# Patient Record
Sex: FEMALE | Race: BLACK OR AFRICAN AMERICAN | NOT HISPANIC OR LATINO | Employment: UNEMPLOYED | URBAN - METROPOLITAN AREA
[De-identification: names, ages, dates, MRNs, and addresses within clinical notes are randomized per-mention and may not be internally consistent; named-entity substitution may affect disease eponyms.]

---

## 2022-03-18 DIAGNOSIS — T85.848A PAIN FROM BREAST IMPLANT, INITIAL ENCOUNTER: Primary | ICD-10-CM

## 2022-04-05 ENCOUNTER — TELEPHONE (OUTPATIENT)
Dept: PLASTIC SURGERY | Facility: CLINIC | Age: 67
End: 2022-04-05
Payer: MEDICARE

## 2022-04-05 NOTE — TELEPHONE ENCOUNTER
----- Message from Esha Garcia sent at 4/5/2022  8:49 AM CDT -----  Vanesa Luu is returning a missed call from the office for scheduling. She said she received a missed call last week. Please give her another call back at [phone]

## 2022-05-03 RX ORDER — GLIMEPIRIDE 2 MG/1
2 TABLET ORAL
COMMUNITY

## 2022-05-03 RX ORDER — PRAVASTATIN SODIUM 40 MG/1
40 TABLET ORAL NIGHTLY
COMMUNITY

## 2022-05-03 RX ORDER — SERTRALINE HYDROCHLORIDE 50 MG/1
50 TABLET, FILM COATED ORAL DAILY
COMMUNITY

## 2022-05-03 RX ORDER — ANASTROZOLE 1 MG/1
1 TABLET ORAL DAILY
COMMUNITY

## 2022-05-03 RX ORDER — ASPIRIN 325 MG
325 TABLET ORAL DAILY
COMMUNITY

## 2022-05-03 RX ORDER — ATENOLOL AND CHLORTHALIDONE TABLET 50; 25 MG/1; MG/1
1 TABLET ORAL DAILY
COMMUNITY

## 2022-05-03 RX ORDER — POTASSIUM CHLORIDE 750 MG/1
10 CAPSULE, EXTENDED RELEASE ORAL ONCE
COMMUNITY
End: 2022-05-19

## 2022-05-09 ENCOUNTER — OFFICE VISIT (OUTPATIENT)
Dept: PLASTIC SURGERY | Facility: CLINIC | Age: 67
End: 2022-05-09
Payer: MEDICARE

## 2022-05-09 VITALS
WEIGHT: 228 LBS | BODY MASS INDEX: 40.4 KG/M2 | OXYGEN SATURATION: 97 % | HEIGHT: 63 IN | DIASTOLIC BLOOD PRESSURE: 78 MMHG | SYSTOLIC BLOOD PRESSURE: 137 MMHG | HEART RATE: 82 BPM

## 2022-05-09 DIAGNOSIS — Z42.8 ENCOUNTER FOR OTHER PLASTIC AND RECONSTRUCTIVE SURGERY FOLLOWING MEDICAL PROCEDURE OR HEALED INJURY: ICD-10-CM

## 2022-05-09 DIAGNOSIS — E66.9 OBESITY, UNSPECIFIED CLASSIFICATION, UNSPECIFIED OBESITY TYPE, UNSPECIFIED WHETHER SERIOUS COMORBIDITY PRESENT: ICD-10-CM

## 2022-05-09 DIAGNOSIS — T85.43XA BREAST IMPLANT RUPTURE, INITIAL ENCOUNTER: Primary | ICD-10-CM

## 2022-05-09 PROCEDURE — 1159F PR MEDICATION LIST DOCUMENTED IN MEDICAL RECORD: ICD-10-PCS | Mod: CPTII,S$GLB,, | Performed by: SURGERY

## 2022-05-09 PROCEDURE — 3008F BODY MASS INDEX DOCD: CPT | Mod: CPTII,S$GLB,, | Performed by: SURGERY

## 2022-05-09 PROCEDURE — 3078F DIAST BP <80 MM HG: CPT | Mod: CPTII,S$GLB,, | Performed by: SURGERY

## 2022-05-09 PROCEDURE — 99204 OFFICE O/P NEW MOD 45 MIN: CPT | Mod: S$GLB,,, | Performed by: SURGERY

## 2022-05-09 PROCEDURE — 1160F RVW MEDS BY RX/DR IN RCRD: CPT | Mod: CPTII,S$GLB,, | Performed by: SURGERY

## 2022-05-09 PROCEDURE — 3008F PR BODY MASS INDEX (BMI) DOCUMENTED: ICD-10-PCS | Mod: CPTII,S$GLB,, | Performed by: SURGERY

## 2022-05-09 PROCEDURE — 99204 PR OFFICE/OUTPT VISIT, NEW, LEVL IV, 45-59 MIN: ICD-10-PCS | Mod: S$GLB,,, | Performed by: SURGERY

## 2022-05-09 PROCEDURE — 3075F SYST BP GE 130 - 139MM HG: CPT | Mod: CPTII,S$GLB,, | Performed by: SURGERY

## 2022-05-09 PROCEDURE — 1159F MED LIST DOCD IN RCRD: CPT | Mod: CPTII,S$GLB,, | Performed by: SURGERY

## 2022-05-09 PROCEDURE — 3075F PR MOST RECENT SYSTOLIC BLOOD PRESS GE 130-139MM HG: ICD-10-PCS | Mod: CPTII,S$GLB,, | Performed by: SURGERY

## 2022-05-09 PROCEDURE — 1160F PR REVIEW ALL MEDS BY PRESCRIBER/CLIN PHARMACIST DOCUMENTED: ICD-10-PCS | Mod: CPTII,S$GLB,, | Performed by: SURGERY

## 2022-05-09 PROCEDURE — 3078F PR MOST RECENT DIASTOLIC BLOOD PRESSURE < 80 MM HG: ICD-10-PCS | Mod: CPTII,S$GLB,, | Performed by: SURGERY

## 2022-05-09 NOTE — PROGRESS NOTES
Consultation BREAST IMPLANT PROBLEM    Chief Complaint: Breast pain    HPI  The patient is a 67 y.o. female with a history of breast implants. These are now causing issues such as pain, deformity. The implants have begun to deflate and collapse in certain areas. Pt would not like to have implants replaced. Pt would be interested in bariatric surgery to see Dr Meyers. Will order MRI today to surveillance implant and capsule.      Current Bra size: unknown  Desired Bra size:    Type of implant:  Smoothtextured: unknown  Silicone    Year placed: 2012    Personal History of breast biopsy or cancer: Left breast cancer  Family history of breast cancer: none  Last mammogram: 2021 due now    History of Bleeding or clotting disorder: none    History of Accutane: none    History of Immune dysfunction: none    History of smoking:none    Occupation retired  Marital status   Children 1      _______________________________________________        PMH  Past Medical History:   Diagnosis Date    Essential (primary) hypertension     History of left breast cancer     Hypercholesteremia     Pain from breast implant     Type 2 diabetes mellitus without complications          PSH  Past Surgical History:   Procedure Laterality Date    bilateral breast implants Bilateral 2013    BILATERAL MASTECTOMY Bilateral 12/2012    DILATION AND CURETTAGE OF UTERUS         OBSTETRIC HISTORY  OB History    No obstetric history on file.         FH  Family History   Problem Relation Age of Onset    Hypertension Mother     Diabetes Mother        MEDICATIONS  Outpatient Medications Marked as Taking for the 5/9/22 encounter (Office Visit) with Rahda Mcnulty MD   Medication Sig Dispense Refill    anastrozole (ARIMIDEX) 1 mg Tab Take 1 mg by mouth once daily.      aspirin 325 MG tablet Take 325 mg by mouth once daily.      atenoloL-chlorthalidone (TENORETIC) 50-25 mg Tab Take 1 tablet by mouth once daily.      dapagliflozin  "propanediol (FARXIGA ORAL) Take by mouth.      dulaglutide (TRULICITY) 1.5 mg/0.5 mL pen injector Inject 1.5 mg into the skin every 7 days.      glimepiride (AMARYL) 2 MG tablet Take 2 mg by mouth before breakfast.      potassium chloride (MICRO-K) 10 MEQ CpSR Take 10 mEq by mouth once.      pravastatin (PRAVACHOL) 40 MG tablet Take 40 mg by mouth nightly.      sertraline (ZOLOFT) 50 MG tablet Take 50 mg by mouth once daily.         ALLERGIES Review of patient's allergies indicates:  No Known Allergies    SOCIAL HISTORY  Tobacco:   Social History     Tobacco Use   Smoking Status Never Smoker   Smokeless Tobacco Never Used     EtOH:   Social History     Substance and Sexual Activity   Alcohol Use Never         ROS  Review of Systems   Constitutional: Negative for chills, fever and malaise/fatigue.   HENT: Negative for congestion.    Eyes: Negative for blurred vision and double vision.   Respiratory: Negative for cough and sputum production.    Cardiovascular: Negative for chest pain and palpitations.   Gastrointestinal: Negative for nausea and vomiting.   Genitourinary: Negative for dysuria and hematuria.   Musculoskeletal: Negative for back pain and joint pain.   Skin: Negative for itching and rash.   Neurological: Negative for dizziness, seizures and headaches.   Psychiatric/Behavioral: Negative for depression. The patient is not nervous/anxious.          PHYSICAL EXAM  /78   Pulse 82   Ht 5' 3" (1.6 m)   Wt 103.4 kg (228 lb)   SpO2 97%   BMI 40.39 kg/m²     Constitutional: Pt is oriented to person, place, and time.  Pt appears well-developed and well-nourished.   HENT: Normocephalic and atraumatic.   Pulmonary/Chest: Effort normal. No respiratory distress.   Abdomen: Soft. Non-tender. No masses or distension.  Musculoskeletal: Normal range of motion. Pt exhibits no edema or deformity.   Neurological: Pt is alert and oriented to person, place, and time. No sensory deficit. Exhibits normal muscle " tone.   Skin: Skin is warm. No rash noted. No erythema.       BREASTS  Asymmetry minimal  Transverse mastectomy, implants in place baker 3 contracture          ASSESSMENT  Implant recon after mastectomy with some contracture noted  Body mass index is 40.39 kg/m².    CPT 74455-34 revision of reconstructed breast  CPT 53064 Implantation of biologic mesh  CPT 23074 Removal implant  CPT 56665-79 Replacement of implant      Referral to bariatric surgery            Radha Mcnulty MD FACS

## 2022-05-10 DIAGNOSIS — L98.9 SKIN LESION: Primary | ICD-10-CM

## 2022-05-16 ENCOUNTER — TELEPHONE (OUTPATIENT)
Dept: PLASTIC SURGERY | Facility: CLINIC | Age: 67
End: 2022-05-16
Payer: MEDICARE

## 2022-05-16 NOTE — PROGRESS NOTES
Patient, Vanesa Luu (MRN #[id number]), presented with a recorded BMI of 40.39 kg/m^2 consistent with the definition of morbid obesity (ICD-10 E66.01). The patient's morbid obesity was monitored, evaluated, addressed and/or treated. This addendum to the medical record is made on 05/16/2022.

## 2022-05-16 NOTE — TELEPHONE ENCOUNTER
----- Message from Wendie Snyder sent at 5/16/2022  8:45 AM CDT -----  Contact: Jeanne/ GARTH  .Type:  Patient Returning Call    Who Called: Jeanne    Does the patient know what this is regarding?: requesting report     Would the patient rather a call back or a response via [facility]?  Callback   Best Call Back Number: [phone] ext [phone]    Additional Information: fax # [phone]

## 2022-05-19 ENCOUNTER — OFFICE VISIT (OUTPATIENT)
Dept: SURGERY | Facility: CLINIC | Age: 67
End: 2022-05-19
Payer: MEDICARE

## 2022-05-19 DIAGNOSIS — R22.42 SUBCUTANEOUS MASS OF LEFT LOWER EXTREMITY: Primary | ICD-10-CM

## 2022-05-19 DIAGNOSIS — L98.9 SKIN LESION: ICD-10-CM

## 2022-05-19 PROCEDURE — 1160F PR REVIEW ALL MEDS BY PRESCRIBER/CLIN PHARMACIST DOCUMENTED: ICD-10-PCS | Mod: CPTII,S$GLB,, | Performed by: SURGERY

## 2022-05-19 PROCEDURE — 99203 OFFICE O/P NEW LOW 30 MIN: CPT | Mod: S$GLB,,, | Performed by: SURGERY

## 2022-05-19 PROCEDURE — 99203 PR OFFICE/OUTPT VISIT, NEW, LEVL III, 30-44 MIN: ICD-10-PCS | Mod: S$GLB,,, | Performed by: SURGERY

## 2022-05-19 PROCEDURE — 1160F RVW MEDS BY RX/DR IN RCRD: CPT | Mod: CPTII,S$GLB,, | Performed by: SURGERY

## 2022-05-19 PROCEDURE — 1159F PR MEDICATION LIST DOCUMENTED IN MEDICAL RECORD: ICD-10-PCS | Mod: CPTII,S$GLB,, | Performed by: SURGERY

## 2022-05-19 PROCEDURE — 1159F MED LIST DOCD IN RCRD: CPT | Mod: CPTII,S$GLB,, | Performed by: SURGERY

## 2022-05-19 RX ORDER — DAPAGLIFLOZIN 10 MG/1
TABLET, FILM COATED ORAL
COMMUNITY
Start: 2022-03-18

## 2022-05-19 RX ORDER — POTASSIUM CHLORIDE 20 MEQ/15ML
SOLUTION ORAL
COMMUNITY
Start: 2021-12-13

## 2022-05-19 RX ORDER — CLOBETASOL PROPIONATE 0.5 MG/G
OINTMENT TOPICAL
COMMUNITY
Start: 2022-05-12

## 2022-05-19 RX ORDER — HYDROCHLOROTHIAZIDE 25 MG/1
TABLET ORAL
COMMUNITY
Start: 2021-11-27

## 2022-05-19 NOTE — PROGRESS NOTES
Subjective:       Patient ID: Vanesa Luu is a 67 y.o. female.    Chief Complaint: Lump (Left buttock)    Ms. Luu is referred by Dr. Davidson at [facility] regarding a skin lesion left buttock.  She says this been present for about a year.  She complains of discomfort in the area when lying on this for long periods of time.  She says it may have enlarged slightly since she 1st noticed it.  She does not recall any injury, trauma, needle sticks, or other precipitating event.  Surgical opinion is requested.    Past Medical History:   Diagnosis Date    Essential (primary) hypertension     History of left breast cancer     Hypercholesteremia     Pain from breast implant     Type 2 diabetes mellitus without complications      Past Surgical History:   Procedure Laterality Date    bilateral breast implants Bilateral 2013    BILATERAL MASTECTOMY Bilateral 12/2012    DILATION AND CURETTAGE OF UTERUS       Family History   Problem Relation Age of Onset    Hypertension Mother     Diabetes Mother      Social History     Socioeconomic History    Marital status:    Tobacco Use    Smoking status: Never Smoker    Smokeless tobacco: Never Used   Substance and Sexual Activity    Alcohol use: Never    Drug use: Never       Current Outpatient Medications   Medication Sig Dispense Refill    anastrozole (ARIMIDEX) 1 mg Tab Take 1 mg by mouth once daily.      aspirin 325 MG tablet Take 325 mg by mouth once daily.      atenoloL-chlorthalidone (TENORETIC) 50-25 mg Tab Take 1 tablet by mouth once daily.      clobetasol 0.05% (TEMOVATE) 0.05 % Oint       dapagliflozin propanediol (FARXIGA ORAL) Take 10 mg by mouth once daily.      dulaglutide (TRULICITY) 1.5 mg/0.5 mL pen injector Inject 1.5 mg into the skin every 7 days.      FARXIGA 10 mg tablet       glimepiride (AMARYL) 2 MG tablet Take 2 mg by mouth before breakfast.      hydroCHLOROthiazide (HYDRODIURIL) 25 MG tablet       HYDROCHLOROTHIAZIDE ORAL  Take by mouth.      potassium chloride 10% (KAYCIEL) 20 mEq/15 mL oral solution       pravastatin (PRAVACHOL) 40 MG tablet Take 40 mg by mouth nightly.      sertraline (ZOLOFT) 50 MG tablet Take 50 mg by mouth once daily.       No current facility-administered medications for this visit.     Review of patient's allergies indicates:  No Known Allergies    Review of Systems   Constitutional: Negative.    Respiratory: Negative.    Cardiovascular: Negative.    Gastrointestinal: Negative.    Musculoskeletal: Negative.    Skin: Negative.    Neurological: Negative.    Hematological: Negative.        Objective:      There were no vitals filed for this visit.  Physical Exam  Constitutional:       General: She is not in acute distress.  HENT:      Head: Normocephalic and atraumatic.   Cardiovascular:      Rate and Rhythm: Normal rate and regular rhythm.      Heart sounds: Normal heart sounds.   Pulmonary:      Effort: Pulmonary effort is normal.      Breath sounds: Normal breath sounds.   Abdominal:      General: Abdomen is flat. Bowel sounds are normal.      Palpations: Abdomen is soft. There is no mass.   Musculoskeletal:         General: No deformity. Normal range of motion.      Cervical back: Normal range of motion and neck supple.   Skin:     General: Skin is warm and dry.      Findings: Lesion present.      Comments: In the left buttock there is a palpable mass approximating 3.5 x 2.5 cm in size, involving the subcutaneous tissue.  The overlying skin is hyperpigmented but without nodularity or color variations.  This is nontender to palpation.   Neurological:      General: No focal deficit present.      Mental Status: She is alert and oriented to person, place, and time.          Assessment:       Mass, left buttock   Plan:       Recommend proceeding with excision of this lesion.  Differential could include fat necrosis, old traumatic hematoma, or some sort of neoplastic process.  Primary risk of surgery are  bleeding, infection, poor wound healing, and recurrence.  This was all discussed with and accepted by the patient.  Surgery will be scheduled at a time convenient for her.

## 2022-06-02 ENCOUNTER — TELEPHONE (OUTPATIENT)
Dept: PLASTIC SURGERY | Facility: CLINIC | Age: 67
End: 2022-06-02
Payer: MEDICARE

## 2022-06-02 NOTE — TELEPHONE ENCOUNTER
Spoke w/pt to see if she has had her breast MRI done yet. Pt states she cannot afford at this time and is in the process of filling out forms and waiting on assistance to have the procedure done. Pt instructed to call back once she has MRI completed.

## 2022-06-06 LAB
ANION GAP SERPL CALC-SCNC: 9 MMOL/L (ref 3–11)
BANDS: 1 % (ref 0–5)
BUN SERPL-MCNC: 20 MG/DL (ref 7–18)
BUN/CREAT SERPL: 19.41 RATIO (ref 7–18)
CALCIUM SERPL-MCNC: 9.2 MG/DL (ref 8.8–10.5)
CELLS COUNTED: 100
CHLORIDE SERPL-SCNC: 101 MMOL/L (ref 100–108)
CO2 SERPL-SCNC: 30 MMOL/L (ref 21–32)
CREAT SERPL-MCNC: 1.03 MG/DL (ref 0.55–1.02)
EOSINOPHIL NFR BLD: 5 % (ref 1–3)
ERYTHROCYTE [DISTWIDTH] IN BLOOD BY AUTOMATED COUNT: 13.9 % (ref 12.5–18)
GFR ESTIMATION: > 60
GLUCOSE SERPL-MCNC: 263 MG/DL (ref 70–110)
HCT VFR BLD AUTO: 38.5 % (ref 37–47)
HGB BLD-MCNC: 13.4 G/DL (ref 12–16)
LYMPHOCYTES NFR BLD: 42 % (ref 25–40)
MCH RBC QN AUTO: 30.4 PG (ref 27–31.2)
MCHC RBC AUTO-ENTMCNC: 34.8 G/DL (ref 31.8–35.4)
MCV RBC AUTO: 87.3 FL (ref 80–97)
MONOCYTES NFR BLD: 5 % (ref 1–15)
NEUTROPHILS # BLD AUTO: 2.9 10*3/UL (ref 1.8–7.7)
NEUTROPHILS NFR BLD: 47 % (ref 37–80)
NUCLEATED RED BLOOD CELLS: 0 %
PLATELETS: 261 10*3/UL (ref 142–424)
POTASSIUM SERPL-SCNC: 3.4 MMOL/L (ref 3.6–5.2)
RBC # BLD AUTO: 4.41 10*6/UL (ref 4.2–5.4)
RBC MORPH BLD: ABNORMAL
SMALL PLATELETS BLD QL SMEAR: ADEQUATE
SODIUM BLD-SCNC: 140 MMOL/L (ref 135–145)
WBC # BLD: 6 10*3/UL (ref 4.6–10.2)

## 2022-06-13 ENCOUNTER — OUTSIDE PLACE OF SERVICE (OUTPATIENT)
Dept: ADMINISTRATIVE | Facility: OTHER | Age: 67
End: 2022-06-13
Payer: MEDICARE

## 2022-06-13 LAB
GLUCOSE SERPL-MCNC: 220 MG/DL (ref 70–105)
GLUCOSE SERPL-MCNC: 224 MG/DL (ref 70–105)

## 2022-06-13 PROCEDURE — 27043 EXC HIP PELVIS LES SC 3 CM/>: CPT | Mod: LT,,, | Performed by: SURGERY

## 2022-06-13 PROCEDURE — 27043 PR EXCISION TUMOR SOFT TISSUE PELVIS&HIP SUBQ 3+CM: ICD-10-PCS | Mod: LT,,, | Performed by: SURGERY

## 2022-06-16 LAB — SPECIMEN TO PATHOLOGY: NORMAL

## 2022-06-20 DIAGNOSIS — Z86.59 HISTORY OF CLAUSTROPHOBIA: Primary | ICD-10-CM

## 2022-06-20 RX ORDER — DIAZEPAM 5 MG/1
5 TABLET ORAL EVERY 6 HOURS PRN
Qty: 3 TABLET | Refills: 0 | Status: SHIPPED | OUTPATIENT
Start: 2022-06-20 | End: 2022-07-20

## 2022-06-21 ENCOUNTER — OFFICE VISIT (OUTPATIENT)
Dept: SURGERY | Facility: CLINIC | Age: 67
End: 2022-06-21
Payer: MEDICARE

## 2022-06-21 DIAGNOSIS — D21.9 GRANULAR CELL TUMOR: Primary | ICD-10-CM

## 2022-06-21 PROCEDURE — 1159F PR MEDICATION LIST DOCUMENTED IN MEDICAL RECORD: ICD-10-PCS | Mod: CPTII,S$GLB,, | Performed by: SURGERY

## 2022-06-21 PROCEDURE — 99024 PR POST-OP FOLLOW-UP VISIT: ICD-10-PCS | Mod: S$GLB,,, | Performed by: SURGERY

## 2022-06-21 PROCEDURE — 1159F MED LIST DOCD IN RCRD: CPT | Mod: CPTII,S$GLB,, | Performed by: SURGERY

## 2022-06-21 PROCEDURE — 99024 POSTOP FOLLOW-UP VISIT: CPT | Mod: S$GLB,,, | Performed by: SURGERY

## 2022-06-21 NOTE — PROGRESS NOTES
Subjective:       Patient ID: Vanesa Luu is a 67 y.o. female.    Chief Complaint: Post-op Evaluation    HPI:  1 week postop excision of a soft tissue mass from the left buttock.  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  Patient has no complaints today.    Review of Systems      Objective:      Physical Exam incisions clean and healing.  All sutures removed.  Steri-Strips placed.  Ongoing wound care discussed.    Assessment:       Granular cell tumor left buttock   Plan:       Wound care discussed.  Return as needed.

## 2022-07-18 ENCOUNTER — OFFICE VISIT (OUTPATIENT)
Dept: PLASTIC SURGERY | Facility: CLINIC | Age: 67
End: 2022-07-18
Payer: MEDICARE

## 2022-07-18 VITALS
WEIGHT: 224 LBS | RESPIRATION RATE: 18 BRPM | HEIGHT: 62 IN | HEART RATE: 62 BPM | DIASTOLIC BLOOD PRESSURE: 78 MMHG | BODY MASS INDEX: 41.22 KG/M2 | SYSTOLIC BLOOD PRESSURE: 134 MMHG

## 2022-07-18 DIAGNOSIS — Z85.3 HISTORY OF BREAST CANCER: ICD-10-CM

## 2022-07-18 DIAGNOSIS — Z98.890 HISTORY OF RECONSTRUCTION OF BOTH BREASTS: Primary | ICD-10-CM

## 2022-07-18 DIAGNOSIS — N64.4 PAIN OF BOTH BREASTS: ICD-10-CM

## 2022-07-18 PROCEDURE — 3075F SYST BP GE 130 - 139MM HG: CPT | Mod: CPTII,S$GLB,, | Performed by: SURGERY

## 2022-07-18 PROCEDURE — 1159F MED LIST DOCD IN RCRD: CPT | Mod: CPTII,S$GLB,, | Performed by: SURGERY

## 2022-07-18 PROCEDURE — 3008F PR BODY MASS INDEX (BMI) DOCUMENTED: ICD-10-PCS | Mod: CPTII,S$GLB,, | Performed by: SURGERY

## 2022-07-18 PROCEDURE — 3078F PR MOST RECENT DIASTOLIC BLOOD PRESSURE < 80 MM HG: ICD-10-PCS | Mod: CPTII,S$GLB,, | Performed by: SURGERY

## 2022-07-18 PROCEDURE — 3078F DIAST BP <80 MM HG: CPT | Mod: CPTII,S$GLB,, | Performed by: SURGERY

## 2022-07-18 PROCEDURE — 3008F BODY MASS INDEX DOCD: CPT | Mod: CPTII,S$GLB,, | Performed by: SURGERY

## 2022-07-18 PROCEDURE — 99213 OFFICE O/P EST LOW 20 MIN: CPT | Mod: S$GLB,,, | Performed by: SURGERY

## 2022-07-18 PROCEDURE — 1159F PR MEDICATION LIST DOCUMENTED IN MEDICAL RECORD: ICD-10-PCS | Mod: CPTII,S$GLB,, | Performed by: SURGERY

## 2022-07-18 PROCEDURE — 99213 PR OFFICE/OUTPT VISIT, EST, LEVL III, 20-29 MIN: ICD-10-PCS | Mod: S$GLB,,, | Performed by: SURGERY

## 2022-07-18 PROCEDURE — 3075F PR MOST RECENT SYSTOLIC BLOOD PRESS GE 130-139MM HG: ICD-10-PCS | Mod: CPTII,S$GLB,, | Performed by: SURGERY

## 2022-07-18 RX ORDER — TRAMADOL HYDROCHLORIDE 50 MG/1
50 TABLET ORAL EVERY 6 HOURS PRN
COMMUNITY
Start: 2022-06-14

## 2022-07-18 NOTE — PROGRESS NOTES
BREAST IMPLANT PROBLEM       7/18/22  Chief Complaint: Breast pain    HPI  The patient is a 67 y.o. female with a history of breast implants. These are now causing issues such as pain, deformity. The implants have begun to deflate and collapse in certain areas. Pt would not like to have implants replaced. Pt would be interested in bariatric surgery to see Dr Meyers.      95014 MRI obtained no evid of rupture    Current Bra size: unknown  Desired Bra size:    Type of implant:  Smoothtextured: unknown  Silicone    Year placed: 2012    Personal History of breast biopsy or cancer: Left breast cancer  Family history of breast cancer: none  Last mammogram: 2021 due now    History of Bleeding or clotting disorder: none    History of Accutane: none    History of Immune dysfunction: none    History of smoking:none    Occupation retired  Marital status   Children 1      _______________________________________________        PMH  Past Medical History:   Diagnosis Date    Essential (primary) hypertension     History of left breast cancer     Hypercholesteremia     Pain from breast implant     Type 2 diabetes mellitus without complications          PSH  Past Surgical History:   Procedure Laterality Date    bilateral breast implants Bilateral 2013    BILATERAL MASTECTOMY Bilateral 12/2012    BUTTOCK MASS EXCISION Left 06/13/2022    Granular cell tumor    DILATION AND CURETTAGE OF UTERUS         OBSTETRIC HISTORY  OB History    No obstetric history on file.         FH  Family History   Problem Relation Age of Onset    Hypertension Mother     Diabetes Mother        MEDICATIONS  Outpatient Medications Marked as Taking for the 7/18/22 encounter (Office Visit) with Radha Mcnulty MD   Medication Sig Dispense Refill    anastrozole (ARIMIDEX) 1 mg Tab Take 1 mg by mouth once daily.      aspirin 325 MG tablet Take 325 mg by mouth once daily.      atenoloL-chlorthalidone (TENORETIC) 50-25 mg Tab Take  "1 tablet by mouth once daily.      clobetasol 0.05% (TEMOVATE) 0.05 % Oint       dapagliflozin propanediol (FARXIGA ORAL) Take 10 mg by mouth once daily.      diazePAM (VALIUM) 5 MG tablet Take 1 tablet (5 mg total) by mouth every 6 (six) hours as needed for Anxiety. 3 tablet 0    dulaglutide (TRULICITY) 1.5 mg/0.5 mL pen injector Inject 1.5 mg into the skin every 7 days.      FARXIGA 10 mg tablet       glimepiride (AMARYL) 2 MG tablet Take 2 mg by mouth before breakfast.      hydroCHLOROthiazide (HYDRODIURIL) 25 MG tablet       HYDROCHLOROTHIAZIDE ORAL Take by mouth.      potassium chloride 10% (KAYCIEL) 20 mEq/15 mL oral solution       pravastatin (PRAVACHOL) 40 MG tablet Take 40 mg by mouth nightly.      sertraline (ZOLOFT) 50 MG tablet Take 50 mg by mouth once daily.      traMADoL (ULTRAM) 50 mg tablet Take 50 mg by mouth every 6 (six) hours as needed.         ALLERGIES Review of patient's allergies indicates:  No Known Allergies    SOCIAL HISTORY  Tobacco:   Social History     Tobacco Use   Smoking Status Never Smoker   Smokeless Tobacco Never Used     EtOH:   Social History     Substance and Sexual Activity   Alcohol Use Never         ROS  Review of Systems   Constitutional: Negative for chills, fever and malaise/fatigue.   HENT: Negative for congestion.    Eyes: Negative for blurred vision and double vision.   Respiratory: Negative for cough and sputum production.    Cardiovascular: Negative for chest pain and palpitations.   Gastrointestinal: Negative for nausea and vomiting.   Genitourinary: Negative for dysuria and hematuria.   Musculoskeletal: Negative for back pain and joint pain.   Skin: Negative for itching and rash.   Neurological: Negative for dizziness, seizures and headaches.   Psychiatric/Behavioral: Negative for depression. The patient is not nervous/anxious.          PHYSICAL EXAM  /78   Pulse 62   Resp 18   Ht 5' 2" (1.575 m)   Wt 101.6 kg (224 lb)   BMI 40.97 kg/m² "     Constitutional: Pt is oriented to person, place, and time.  Pt appears well-developed and well-nourished.   HENT: Normocephalic and atraumatic.   Pulmonary/Chest: Effort normal. No respiratory distress.   Abdomen: Soft. Non-tender. No masses or distension.  Musculoskeletal: Normal range of motion. Pt exhibits no edema or deformity.   Neurological: Pt is alert and oriented to person, place, and time. No sensory deficit. Exhibits normal muscle tone.   Skin: Skin is warm. No rash noted. No erythema.       BREASTS  Asymmetry minimal  Transverse mastectomy, implants in place baker 3 contracture          ASSESSMENT  Implant recon after mastectomy with some contracture noted  Body mass index is 40.97 kg/m².    CPT 26034-02 revision of reconstructed breast  CPT 77743-91  Removal implant  CPT 76643- 1 chest wall     Radha Mcnulty MD FACS

## 2022-07-27 ENCOUNTER — TELEPHONE (OUTPATIENT)
Dept: PLASTIC SURGERY | Facility: CLINIC | Age: 67
End: 2022-07-27
Payer: MEDICARE

## 2022-07-27 NOTE — TELEPHONE ENCOUNTER
Spoke with pt about canceling surgery until her A1C is closer to 7. She states she is interested in the bariatric surgery now. Dr. Meyers office called and they will reach out to the patient today.

## 2022-07-27 NOTE — TELEPHONE ENCOUNTER
----- Message from Raven Sandhu sent at 7/27/2022  2:42 PM CDT -----  Type:  Needs Medical Advice    Who Called: Vanesa Luu    Symptoms (please be specific): -   How long has patient had these symptoms:  -  Pharmacy name and phone #:  -  Would the patient rather a call back or a response via [facility]? -  Best Call Back Number: [phone]    Additional Information: pt needs to speak w/ Nasima, please call

## 2022-07-27 NOTE — TELEPHONE ENCOUNTER
Call patient to talk to her about her upcoming surgery with chapincito Johnson her lab results and her Hg A1c is elevated.

## 2022-07-28 ENCOUNTER — DOCUMENTATION ONLY (OUTPATIENT)
Dept: PLASTIC SURGERY | Facility: CLINIC | Age: 67
End: 2022-07-28
Payer: MEDICARE

## 2022-07-28 NOTE — PROGRESS NOTES
Patient A1C from 7/18/22 (9.4)  Surgery to removal implants postponed until A1C is 7 and her blood sugars are under control. The risk of infection and complications is too high to proceed with an elective procedure.     Pt has been referred to Dr. Meyers for evaluation for bariatric surgery and possible nutrition education.

## 2023-10-06 ENCOUNTER — TELEPHONE (OUTPATIENT)
Dept: PLASTIC SURGERY | Facility: CLINIC | Age: 68
End: 2023-10-06
Payer: MEDICARE

## 2023-10-06 NOTE — TELEPHONE ENCOUNTER
----- Message from Norma Mosquera sent at 10/6/2023  8:45 AM CDT -----  Contact: Pt  Type:  Sooner Apoointment Request    Caller is requesting a sooner appointment.  Caller declined first available appointment listed below.  Caller will not accept being placed on the waitlist and is requesting a message be sent to doctor.  Name of Caller: pt   When is the first available appointment?  Symptoms:  follow up on getting breast implants down to 196 lbs  Would the patient rather a call back or a response via [facility]? phone  Best Call Back Number: [phone]  Additional Information:  mid to late next week so that she can get transportation

## 2023-10-06 NOTE — TELEPHONE ENCOUNTER
Informed office will no longer be open as of October 20th and we are not able to schedule any surgeries for here in [place]. She can have her surgery completed in [place] at Dr. Mcnulty's office there if she would like. Verbalized understanding and declined appointment